# Patient Record
Sex: FEMALE | Race: WHITE | NOT HISPANIC OR LATINO | URBAN - METROPOLITAN AREA
[De-identification: names, ages, dates, MRNs, and addresses within clinical notes are randomized per-mention and may not be internally consistent; named-entity substitution may affect disease eponyms.]

---

## 2018-11-16 ENCOUNTER — EMERGENCY (EMERGENCY)
Facility: HOSPITAL | Age: 28
LOS: 1 days | Discharge: HOME | End: 2018-11-16
Attending: EMERGENCY MEDICINE
Payer: COMMERCIAL

## 2018-11-16 VITALS
HEART RATE: 109 BPM | OXYGEN SATURATION: 98 % | RESPIRATION RATE: 16 BRPM | DIASTOLIC BLOOD PRESSURE: 74 MMHG | TEMPERATURE: 98 F | SYSTOLIC BLOOD PRESSURE: 105 MMHG | WEIGHT: 110.01 LBS | HEIGHT: 65 IN

## 2018-11-16 VITALS
OXYGEN SATURATION: 99 % | RESPIRATION RATE: 18 BRPM | SYSTOLIC BLOOD PRESSURE: 96 MMHG | HEART RATE: 87 BPM | DIASTOLIC BLOOD PRESSURE: 50 MMHG

## 2018-11-16 LAB
ALBUMIN SERPL ELPH-MCNC: 4.7 G/DL — SIGNIFICANT CHANGE UP (ref 3.3–5)
ALP SERPL-CCNC: 73 U/L — SIGNIFICANT CHANGE UP (ref 40–120)
ALT FLD-CCNC: 11 U/L — SIGNIFICANT CHANGE UP (ref 10–45)
ANION GAP SERPL CALC-SCNC: 15 MMOL/L — SIGNIFICANT CHANGE UP (ref 5–17)
AST SERPL-CCNC: 14 U/L — SIGNIFICANT CHANGE UP (ref 10–40)
BASOPHILS # BLD AUTO: 0 K/UL — SIGNIFICANT CHANGE UP (ref 0–0.2)
BASOPHILS NFR BLD AUTO: 0.4 % — SIGNIFICANT CHANGE UP (ref 0–2)
BILIRUB SERPL-MCNC: 1.1 MG/DL — SIGNIFICANT CHANGE UP (ref 0.2–1.2)
BUN SERPL-MCNC: 12 MG/DL — SIGNIFICANT CHANGE UP (ref 7–23)
CALCIUM SERPL-MCNC: 9.6 MG/DL — SIGNIFICANT CHANGE UP (ref 8.4–10.5)
CHLORIDE SERPL-SCNC: 101 MMOL/L — SIGNIFICANT CHANGE UP (ref 96–108)
CO2 SERPL-SCNC: 22 MMOL/L — SIGNIFICANT CHANGE UP (ref 22–31)
CREAT SERPL-MCNC: 0.74 MG/DL — SIGNIFICANT CHANGE UP (ref 0.5–1.3)
D DIMER BLD IA.RAPID-MCNC: <150 NG/ML DDU — SIGNIFICANT CHANGE UP
EOSINOPHIL # BLD AUTO: 0.3 K/UL — SIGNIFICANT CHANGE UP (ref 0–0.5)
EOSINOPHIL NFR BLD AUTO: 2.7 % — SIGNIFICANT CHANGE UP (ref 0–6)
GLUCOSE SERPL-MCNC: 87 MG/DL — SIGNIFICANT CHANGE UP (ref 70–99)
HCT VFR BLD CALC: 41.4 % — SIGNIFICANT CHANGE UP (ref 34.5–45)
HGB BLD-MCNC: 14 G/DL — SIGNIFICANT CHANGE UP (ref 11.5–15.5)
LYMPHOCYTES # BLD AUTO: 0.9 K/UL — LOW (ref 1–3.3)
LYMPHOCYTES # BLD AUTO: 8.9 % — LOW (ref 13–44)
MCHC RBC-ENTMCNC: 30.2 PG — SIGNIFICANT CHANGE UP (ref 27–34)
MCHC RBC-ENTMCNC: 33.9 GM/DL — SIGNIFICANT CHANGE UP (ref 32–36)
MCV RBC AUTO: 89.2 FL — SIGNIFICANT CHANGE UP (ref 80–100)
MONOCYTES # BLD AUTO: 1 K/UL — HIGH (ref 0–0.9)
MONOCYTES NFR BLD AUTO: 9.4 % — SIGNIFICANT CHANGE UP (ref 2–14)
NEUTROPHILS # BLD AUTO: 8 K/UL — HIGH (ref 1.8–7.4)
NEUTROPHILS NFR BLD AUTO: 78.6 % — HIGH (ref 43–77)
PLATELET # BLD AUTO: 227 K/UL — SIGNIFICANT CHANGE UP (ref 150–400)
POTASSIUM SERPL-MCNC: 3.8 MMOL/L — SIGNIFICANT CHANGE UP (ref 3.5–5.3)
POTASSIUM SERPL-SCNC: 3.8 MMOL/L — SIGNIFICANT CHANGE UP (ref 3.5–5.3)
PROT SERPL-MCNC: 8 G/DL — SIGNIFICANT CHANGE UP (ref 6–8.3)
RAPID RVP RESULT: DETECTED
RBC # BLD: 4.64 M/UL — SIGNIFICANT CHANGE UP (ref 3.8–5.2)
RBC # FLD: 11.4 % — SIGNIFICANT CHANGE UP (ref 10.3–14.5)
RV+EV RNA SPEC QL NAA+PROBE: DETECTED
SODIUM SERPL-SCNC: 138 MMOL/L — SIGNIFICANT CHANGE UP (ref 135–145)
WBC # BLD: 10.1 K/UL — SIGNIFICANT CHANGE UP (ref 3.8–10.5)
WBC # FLD AUTO: 10.1 K/UL — SIGNIFICANT CHANGE UP (ref 3.8–10.5)

## 2018-11-16 PROCEDURE — 71046 X-RAY EXAM CHEST 2 VIEWS: CPT

## 2018-11-16 PROCEDURE — 71046 X-RAY EXAM CHEST 2 VIEWS: CPT | Mod: 26

## 2018-11-16 PROCEDURE — 96374 THER/PROPH/DIAG INJ IV PUSH: CPT

## 2018-11-16 PROCEDURE — 87798 DETECT AGENT NOS DNA AMP: CPT

## 2018-11-16 PROCEDURE — 87581 M.PNEUMON DNA AMP PROBE: CPT

## 2018-11-16 PROCEDURE — 94640 AIRWAY INHALATION TREATMENT: CPT

## 2018-11-16 PROCEDURE — 93005 ELECTROCARDIOGRAM TRACING: CPT

## 2018-11-16 PROCEDURE — 85027 COMPLETE CBC AUTOMATED: CPT

## 2018-11-16 PROCEDURE — 99284 EMERGENCY DEPT VISIT MOD MDM: CPT | Mod: 25

## 2018-11-16 PROCEDURE — 87633 RESP VIRUS 12-25 TARGETS: CPT

## 2018-11-16 PROCEDURE — 80053 COMPREHEN METABOLIC PANEL: CPT

## 2018-11-16 PROCEDURE — 85379 FIBRIN DEGRADATION QUANT: CPT

## 2018-11-16 PROCEDURE — 93010 ELECTROCARDIOGRAM REPORT: CPT

## 2018-11-16 PROCEDURE — 99285 EMERGENCY DEPT VISIT HI MDM: CPT | Mod: 25

## 2018-11-16 PROCEDURE — 87486 CHLMYD PNEUM DNA AMP PROBE: CPT

## 2018-11-16 RX ORDER — ACETAMINOPHEN 500 MG
650 TABLET ORAL ONCE
Qty: 0 | Refills: 0 | Status: COMPLETED | OUTPATIENT
Start: 2018-11-16 | End: 2018-11-16

## 2018-11-16 RX ORDER — IPRATROPIUM/ALBUTEROL SULFATE 18-103MCG
3 AEROSOL WITH ADAPTER (GRAM) INHALATION ONCE
Qty: 0 | Refills: 0 | Status: COMPLETED | OUTPATIENT
Start: 2018-11-16 | End: 2018-11-16

## 2018-11-16 RX ORDER — ALBUTEROL 90 UG/1
4 AEROSOL, METERED ORAL
Qty: 1 | Refills: 0 | OUTPATIENT
Start: 2018-11-16 | End: 2018-12-15

## 2018-11-16 RX ORDER — SODIUM CHLORIDE 9 MG/ML
2000 INJECTION INTRAMUSCULAR; INTRAVENOUS; SUBCUTANEOUS ONCE
Qty: 0 | Refills: 0 | Status: COMPLETED | OUTPATIENT
Start: 2018-11-16 | End: 2018-11-16

## 2018-11-16 RX ADMIN — Medication 3 MILLILITER(S): at 08:48

## 2018-11-16 RX ADMIN — Medication 3 MILLILITER(S): at 08:50

## 2018-11-16 RX ADMIN — Medication 650 MILLIGRAM(S): at 08:49

## 2018-11-16 RX ADMIN — Medication 125 MILLIGRAM(S): at 08:49

## 2018-11-16 RX ADMIN — SODIUM CHLORIDE 1000 MILLILITER(S): 9 INJECTION INTRAMUSCULAR; INTRAVENOUS; SUBCUTANEOUS at 08:48

## 2018-11-16 NOTE — ED PROVIDER NOTE - MEDICAL DECISION MAKING DETAILS
28f cough, fever, pleuritic chest pain, likely viral bronchitis, with wheezing on Physical will do CXR, to r/o PNA, less likely PE with no risk factors and no leg swelling, mild tachycardia, likely from fever, will do labs, RVP, nebs, tylenol, steroids, reassess and decide for further PE workup

## 2018-11-16 NOTE — ED ADULT NURSE NOTE - NSIMPLEMENTINTERV_GEN_ALL_ED
Implemented All Universal Safety Interventions:  [place] to call system. Call bell, personal items and telephone within reach. Instruct patient to call for assistance. Room bathroom lighting operational. Non-slip footwear when patient is off stretcher. Physically safe environment: no spills, clutter or unnecessary equipment. Stretcher in lowest position, wheels locked, appropriate side rails in place.

## 2018-11-16 NOTE — ED ADULT NURSE NOTE - OBJECTIVE STATEMENT
Female 28 years old with history of Bronchial Asthma came in for cough with yellowish grayish thick sputum for 2 weeks. Pt reports she finished Amoxicillin for 10 days but cough is getting worst associated with mid sternal chest pain and back pain when coughing. Had fever last night. Sick contact with daughter diagnosed with Pneumonia. Denies shortness of breathe, chills, N/V and urinary symptoms. IV lock gauge 20 at right AC. Comfort/safety maintained.  at bedside.

## 2018-11-16 NOTE — ED PROVIDER NOTE - PROGRESS NOTE DETAILS
patient improved with nebs and normal ddimer, unlikely PE despite EKG changes, will give return precautions, also will send home with prednisone, augmentin, albuterol, followup pcp

## 2018-11-16 NOTE — ED PROVIDER NOTE - SECONDARY DIAGNOSIS.
Exacerbation of asthma, unspecified asthma severity, unspecified whether persistent Sinusitis, unspecified chronicity, unspecified location

## 2018-11-16 NOTE — ED ADULT TRIAGE NOTE - CHIEF COMPLAINT QUOTE
chest pain and trouble breathing. Sick contacts +PNA. "I can feel a rumbling". Productive cough. Cough x3 weeks. Saw MD and was given amoxicillin. Fever last night with chills.

## 2018-11-16 NOTE — ED PROVIDER NOTE - PHYSICAL EXAMINATION
Physical Exam:  Gen: NAD, AOx3  Head: no scalp abnormalities  Eyes: eyes are aligned, lids, conjunctivae and sclera are normal; pupils are 3mm and equal; brisk response to light; extraocular movements intact  Ears: Outer ear without lesions, normal acuity, tympanic canals normal, tympanic membranes with normal light reflex, mild erythema on R ear canal, no bulge  Nose/Sinuses: Nasal mucosa non-inflamed, mild tenderness over L maxillary sinus  Neck: , no occipital, auricular, submandibular, submental or supraclavicular nodes, trachea in midline, thyroid lobes not palpable  Lung: no respiratory distress, + wheezes B/L Inspiratory and expiratory, speaking in full sentences  CV: RRR, no murmurs, rubs or gallops  Abd: soft, NTND, no guarding, no CVA tenderness   MSK: no visible deformities, ROM normal in UE/LE, no neck stiffness   Neuro: No focal sensory or motor deficits  Skin: Warm, well perfused, no rash  Psych: normal affect, calm  ~Grabiel Sigala D.O. -Resident

## 2018-11-16 NOTE — ED PROVIDER NOTE - NS ED ROS FT
ROS:  GENERAL: + fever, no chills  EYES: no change in vision  HEENT: no trouble swallowing, no trouble speaking, sinus pressure, R ear pain  CARDIAC: pleuritic lower chest pain around ribs 10 parasternal  PULMONARY: + cough, + shortness of breath  GI: no abdominal pain, no nausea, no vomiting, no diarrhea, no constipation  : No dysuria, no frequency, no change in appearance, or odor of urine  SKIN: no rashes  NEURO: + headache, no weakness  MSK: No joint pain  ~Grabiel Sigala D.O. -Resident

## 2018-11-16 NOTE — ED ADULT NURSE REASSESSMENT NOTE - NS ED NURSE REASSESS COMMENT FT1
for discharged. BP 96/50. Pt is clinically asymptomatic. Stated "I'm dhiraj, my BP is always low. I feel fine now". MD aware, stated it's alright for pt to be discharged.

## 2018-11-16 NOTE — ED PROVIDER NOTE - OBJECTIVE STATEMENT
28f pmh asthma, cc cough for 3 weeks. patient states having sinusitis dx in late october with a course of amoxicillin provided by her PCP, and feeling worsening cough since. Patient states she has felt pleuritic chest pain with cough that radiates to the back. cough is productive of yellowish/greenish sputum and also nasal discharge of similar appearance mucus. patient has had headache gradual onset, no photophobia, no neck stiffness, on the right occipital area, fever yesterday but did not measure. Has not had chills, nausea, vomiting, abdominal pain, diarrhea, constipation, body aches, leg swelling, lightheadedness. Never smoker. has been using her albuterol inhaler with mild relief and also cough syrup for past week. States daughter is sick with pneumonia at home. Did not receive the flu shot this year.

## 2018-11-16 NOTE — ED PROVIDER NOTE - ATTENDING CONTRIBUTION TO CARE
sinus pressure weeks. dry cough.  lungs w wheeze on initial exam +ttp max sinus. nad, very well appearing.  xr chest. treat for sinusitis. possible asthma component.

## 2018-11-16 NOTE — ED PROVIDER NOTE - CARE PLAN
Principal Discharge DX:	Viral URI with cough  Secondary Diagnosis:	Exacerbation of asthma, unspecified asthma severity, unspecified whether persistent  Secondary Diagnosis:	Sinusitis, unspecified chronicity, unspecified location

## 2024-11-04 NOTE — ED ADULT NURSE NOTE - MODE OF DISCHARGE
lidocaine 1 % 1.43 mL IM Injection (500 mg IntraMUSCular Given 11/1/24 3306)   azithromycin (ZITHROMAX) tablet 1,000 mg (1,000 mg Oral Given 11/1/24 3180)       ED Orders Placed:  Orders Placed This Encounter   Procedures    Urine Culture    C.trachomatis N.gonorrhoeae DNA, Urine    Urinalysis with Reflex to Culture         SDH / COUNSELING     Counseling:  TOBACCO COUNSELING:   Upon evaluation, pt expressed that they are an active tobacco user. For approximately 6 minutes, I counseled pt face-to-face on the dangers of smoking and encouraged quitting as soon as possible in order to decrease further risks to their health. I assessed their readiness to quit smoking and patient states they are actively thinking about it but the habit of smoking as well as euphoric effect of nicotine are barriers. I encouraged them to continue thinking about it and to set a quitting date. I provided specific suggestions on how to quit smoking, including CBT, support groups, acupuncture, medication assistance. Pt has conveyed their understanding of the risks involved should they continue to use tobacco products.      COMPLEXITY       Amount and/or Complexity of Data Reviewed  HIGH complexity decision making performed   Presentation: ACUTE and SEVERE (giving consideration to thing such as systemic symptoms, impact on quality of life, morbidity and mortality).  Clinical lab tests and radiology images: if applicable they were ordered as appropriate, I personally reviewed and interpreted all available results  I personally review and interpreted pertinent past medical records; if applicable provided a summary in my note  If ordered, I independent viewed and interpreted images, ECGs, or specimens  If possible independent history obtained from other sources such as family, EMS, bystanders, law enforcement.    Risks  OTC medications - discussed appropriate use, side effects, indications  Prescription drug management.- discussed appropriate use 
Ambulatory